# Patient Record
Sex: MALE | Race: WHITE | Employment: STUDENT | URBAN - METROPOLITAN AREA
[De-identification: names, ages, dates, MRNs, and addresses within clinical notes are randomized per-mention and may not be internally consistent; named-entity substitution may affect disease eponyms.]

---

## 2018-04-09 ENCOUNTER — DOCUMENTATION ONLY (OUTPATIENT)
Dept: NUTRITION | Age: 23
End: 2018-04-09

## 2018-04-10 ENCOUNTER — TELEPHONE (OUTPATIENT)
Dept: NUTRITION | Age: 23
End: 2018-04-10

## 2018-04-10 NOTE — TELEPHONE ENCOUNTER
Nutrition Counseling: Called pt regarding Dr. Eliza Patel Jr.'s referral for nutrition counseling. Pt is interested, and would like insurance checked. RD will contact pt when insurance check is complete to provide coverage details and schedule appt.     Bell Calvert, [address] [place] [address],   Outpatient Dietitian  Office: [phone]  Cell: [phone]

## 2018-04-16 ENCOUNTER — TELEPHONE (OUTPATIENT)
Dept: NUTRITION | Age: 23
End: 2018-04-16

## 2018-04-16 NOTE — TELEPHONE ENCOUNTER
Nutrition Counseling: Called pt and left voicemail regarding insurance coverage information and self-pay rates. Noted availability and encouraged pt to call back with questions and/or to schedule.      Bell Calvert, [address],   Outpatient Dietitian  Office: [phone]  Cell: [phone]

## 2018-04-30 ENCOUNTER — DOCUMENTATION ONLY (OUTPATIENT)
Dept: NUTRITION | Age: 23
End: 2018-04-30

## 2018-04-30 NOTE — PROGRESS NOTES
Nutrition Counseling:  Pt has not returned calls/contacted RD further to schedule appt. Will close referral for this office.     Melvi Wilkerson, [address],   Outpatient Dietitian  Office: [phone]  Cell: [phone]